# Patient Record
Sex: MALE | Race: WHITE | NOT HISPANIC OR LATINO | Employment: UNEMPLOYED | URBAN - METROPOLITAN AREA
[De-identification: names, ages, dates, MRNs, and addresses within clinical notes are randomized per-mention and may not be internally consistent; named-entity substitution may affect disease eponyms.]

---

## 2022-06-22 ENCOUNTER — HOSPITAL ENCOUNTER (EMERGENCY)
Facility: CLINIC | Age: 62
Discharge: HOME | End: 2022-06-22
Attending: EMERGENCY MEDICINE | Admitting: EMERGENCY MEDICINE
Payer: MEDICAID

## 2022-06-22 VITALS
WEIGHT: 175 LBS | RESPIRATION RATE: 18 BRPM | HEIGHT: 73 IN | HEART RATE: 86 BPM | SYSTOLIC BLOOD PRESSURE: 126 MMHG | TEMPERATURE: 98.5 F | OXYGEN SATURATION: 96 % | BODY MASS INDEX: 23.19 KG/M2 | DIASTOLIC BLOOD PRESSURE: 84 MMHG

## 2022-06-22 DIAGNOSIS — F10.920 ALCOHOLIC INTOXICATION WITHOUT COMPLICATION (H): ICD-10-CM

## 2022-06-22 LAB — HOLD SPECIMEN: NORMAL

## 2022-06-22 PROCEDURE — 99282 EMERGENCY DEPT VISIT SF MDM: CPT | Performed by: EMERGENCY MEDICINE

## 2022-06-22 NOTE — ED PROVIDER NOTES
[facility] EMERGENCY DEPARTMENT ([facility])       6/22/22  History     Chief Complaint   Patient presents with     Alcohol Problem     Pt has been sober for 35 days and relapsed 3 days ago. Pt seeking detox from alcohol     The history is provided by the patient and medical records.     Donovan Butler is a 61 year old malewho presents to the Emergency Department for an alcohol problem.  Patient reports drinking around a pint for the past 3 days after being 35 days.  Patient reports he is here from [place] for treatment and then was moved to the sober house.  Patient came from his sober house for detox.  Patient reports he started drinking because of increased free time and recently discussed some issues in a meeting.  He denies any suicidal homicidal ideation.  He does want to go back to his previous treatment facility at the [place].  He denies any other drug abuse.    Past Medical History  Past Medical History:   Diagnosis Date     COPD (chronic obstructive pulmonary disease) (H)      History reviewed. No pertinent surgical history.  No current outpatient medications on file.    No Known Allergies  Family History  History reviewed. No pertinent family history.  Social History   Social History     Tobacco Use     Smoking status: Current Every Day Smoker     Types: Cigarettes     Smokeless tobacco: Never Used   Substance Use Topics     Alcohol use: Yes     Comment: daily for las 3 days     Drug use: Not Currently      Past medical history, past surgical history, medications, allergies, family history, and social history were reviewed with the patient. No additional pertinent items.     I have reviewed the Medications, Allergies, Past Medical and Surgical History, and Social History in the Epic system.    Review of Systems  A complete review of systems was performed with pertinent positives and negatives noted in the HPI, and all other systems negative.    Physical Exam   BP: 126/84  Pulse: 86  Temp: 98.5  " F (36.9  C)  Resp: 18  Height: 185.4 cm (6' 1\")  Weight: 79.4 kg (175 lb)  SpO2: 96 %      Physical Exam  Vitals and nursing note reviewed.   Constitutional:       General: He is not in acute distress.     Appearance: He is well-developed.   HENT:      Head: Normocephalic.      Right Ear: External ear normal.      Left Ear: External ear normal.      Nose: Nose normal.   Eyes:      Extraocular Movements: Extraocular movements intact.      Conjunctiva/sclera: Conjunctivae normal.   Pulmonary:      Effort: Pulmonary effort is normal. No respiratory distress.   Abdominal:      General: Abdomen is flat. There is no distension.   Musculoskeletal:         General: No deformity. Normal range of motion.      Cervical back: Normal range of motion and neck supple.   Skin:     General: Skin is warm and dry.   Neurological:      Mental Status: He is alert.      Comments: Oriented, mildly slurred speech consistent with alcohol intoxication   Psychiatric:         Mood and Affect: Mood normal.         Behavior: Behavior normal.         ED Course     At 1:22 AM the patient was seen and examined by Umesh Syed DO in [facility]07 .        Procedures         Results for orders placed or performed during the hospital encounter of 06/22/22 (from the past 24 hour(s))   Extra Tube    Narrative    The following orders were created for panel order Extra Tube.  Procedure                               Abnormality         Status                     ---------                               -----------         ------                     Extra Urine Collection[[id number]]                           Final result                 Please view results for these tests on the individual orders.   Extra Urine Collection   Result Value Ref Range    Hold Specimen [facility]      Medications - No data to display          Assessments & Plan (with Medical Decision Making)   61-year-old male presents to us with chief complaint of alcohol intoxication.  He is not " suicidal and is experiencing no other symptoms.  This is otherwise uncomplicated.  He is only been drinking for the last 3 days.  He does not need detox at this time although he does want return back to treatment.  Patient was observed in the emergency department until the a.m. at which point he was discharged clinically sober.    I have reviewed the nursing notes.    I have reviewed the findings, diagnosis, plan and need for follow up with the patient.    There are no discharge medications for this patient.      Final diagnoses:   Alcoholic intoxication without complication (H)       I, Elias Conti am serving as a trained medical scribe to document services personally performed by Umesh Syed DO, based on the provider's statements to me.      I, Umesh Syed DO, was physically present and have reviewed and verified the accuracy of this note documented by Elias Conti.     Umesh Syed DO  6/22/2022   [facility] EMERGENCY DEPARTMENT     Umesh Syed DO  06/23/22 0330

## 2022-12-19 ENCOUNTER — PATIENT OUTREACH (OUTPATIENT)
Dept: CARE COORDINATION | Facility: CLINIC | Age: 62
End: 2022-12-19

## 2022-12-19 ASSESSMENT — ACTIVITIES OF DAILY LIVING (ADL): DEPENDENT_IADLS:: INDEPENDENT

## 2022-12-19 NOTE — PROGRESS NOTES
Clinic Care Coordination Contact    Clinic Care Coordination Contact  OUTREACH    Referral Information:  Referral Source: PCP         Chief Complaint   Patient presents with     Clinic Care Coordination - Initial        Universal Utilization:   Clinic Utilization  Difficulty keeping appointments:: No  Compliance Concerns: No  No-Show Concerns: No  Utilization    Hospital Admissions  0             ED Visits  1             No Show Count (past year)  0                Current as of: 12/19/2022 11:49 AM              Clinical Concerns:  Current Medical Concerns: shoulder in need of replacement, lung concerns  Current Behavioral Concerns: alcoholism in recovery    Education Provided to patient: introduced CC      Health Maintenance Reviewed: Up to date  Clinical Pathway: None    Medication Management:  Medication review status: Medications reviewed and no changes reported per patient.             Functional Status:  Dependent ADLs:: Independent  Dependent IADLs:: Independent  Bed or wheelchair confined:: No  Mobility Status: Independent  Fallen 2 or more times in the past year?: No  Any fall with injury in the past year?: No    Living Situation:  Current living arrangement:: I live in a private home with family  Type of residence:: Private home - no stairs    Lifestyle & Psychosocial Needs:PC to [facility] to assess for needs. Pt reports that he has figured out the dilemma of where to live while on pain medication after his surgery. He spoke to his sober  and they will have someone administer his medication once he is back at the sober house. Pt reports that he recently fell on the ice and cracked his ribs which hurts when he coughs. Pt reports to be seeing a pulminologist for some lung issues and coughs a lot. Pt has an appt with pulmonology in 5 weeks and will then look at getting shoulder surgery . Pt has a county   and a care coordinator with his health insurance. [facility] encouraged him to talk with his  CC prior to his surgery to ensure home care is covered and available to him post surgery. Pt denies any additional needs. He continues to be sober, attends meetings and has mental health support. He took this writers number in the event he should need anything moving forward.    Social Determinants of Health     Tobacco Use: High Risk     Smoking Tobacco Use: Every Day     Smokeless Tobacco Use: Never     Passive Exposure: Not on file   Alcohol Use: Not on file   Financial Resource Strain: Not on file   Food Insecurity: Not on file   Transportation Needs: Not on file   Physical Activity: Not on file   Stress: Not on file   Social Connections: Not on file   Intimate Partner Violence: Not on file   Depression: Not on file   Housing Stability: Not on file        Transportation means:: Public transportation     Mandaeism or spiritual beliefs that impact treatment:: No  Chemical Dependency Status: Past Concern  Chemical Dependency Management: Previous treatment, AA, Sponsor, Other (see comment) (sober house)             Resources and Interventions:  Current Resources:      Community Resources: Chemical Dependency Services, [facility] Programs  Supplies Currently Used at Home: None  Equipment Currently Used at Home: none  Employment Status: employed part-time         Advance Care Plan/Directive  Advanced Care Plans/Directives on file:: No    Referrals Placed: None         Care Plan:      Patient/Caregiver understanding:            Plan: Pt will follow up with pulmonology, orthopedic surgery, [facility] will do no further outreaches.

## 2023-01-10 ENCOUNTER — MEDICAL CORRESPONDENCE (OUTPATIENT)
Dept: HEALTH INFORMATION MANAGEMENT | Facility: CLINIC | Age: 63
End: 2023-01-10

## 2023-01-27 RX ORDER — BUDESONIDE AND FORMOTEROL FUMARATE DIHYDRATE 160; 4.5 UG/1; UG/1
2 AEROSOL RESPIRATORY (INHALATION) 2 TIMES DAILY
COMMUNITY

## 2023-01-27 RX ORDER — GABAPENTIN 100 MG/1
200 CAPSULE ORAL 2 TIMES DAILY
COMMUNITY

## 2023-01-27 RX ORDER — BUPROPION HYDROCHLORIDE 300 MG/1
300 TABLET ORAL EVERY MORNING
COMMUNITY

## 2023-01-27 RX ORDER — METHOCARBAMOL 750 MG/1
750 TABLET, FILM COATED ORAL 2 TIMES DAILY
COMMUNITY

## 2023-01-31 ENCOUNTER — ANESTHESIA EVENT (OUTPATIENT)
Dept: SURGERY | Facility: CLINIC | Age: 63
End: 2023-01-31
Payer: COMMERCIAL

## 2023-02-01 ENCOUNTER — APPOINTMENT (OUTPATIENT)
Dept: RADIOLOGY | Facility: CLINIC | Age: 63
End: 2023-02-01
Attending: PHYSICIAN ASSISTANT
Payer: COMMERCIAL

## 2023-02-01 ENCOUNTER — HOSPITAL ENCOUNTER (INPATIENT)
Facility: CLINIC | Age: 63
LOS: 1 days | Discharge: HOME | End: 2023-02-02
Attending: ORTHOPAEDIC SURGERY | Admitting: ORTHOPAEDIC SURGERY
Payer: COMMERCIAL

## 2023-02-01 ENCOUNTER — ANESTHESIA (OUTPATIENT)
Dept: SURGERY | Facility: CLINIC | Age: 63
End: 2023-02-01
Payer: COMMERCIAL

## 2023-02-01 DIAGNOSIS — Z96.611 STATUS POST TOTAL SHOULDER ARTHROPLASTY, RIGHT: Primary | ICD-10-CM

## 2023-02-01 PROBLEM — F33.40 RECURRENT MAJOR DEPRESSIVE DISORDER, IN REMISSION (H): Status: ACTIVE | Noted: 2023-02-01

## 2023-02-01 PROBLEM — J45.20 MILD INTERMITTENT ASTHMA WITHOUT COMPLICATION: Status: ACTIVE | Noted: 2023-02-01

## 2023-02-01 PROCEDURE — 120N000001 HC R&B MED SURG/OB

## 2023-02-01 PROCEDURE — 258N000003 HC RX IP 258 OP 636: Performed by: NURSE ANESTHETIST, CERTIFIED REGISTERED

## 2023-02-01 PROCEDURE — 710N000010 HC RECOVERY PHASE 1, LEVEL 2, PER MIN: Performed by: ORTHOPAEDIC SURGERY

## 2023-02-01 PROCEDURE — C1713 ANCHOR/SCREW BN/BN,TIS/BN: HCPCS | Performed by: ORTHOPAEDIC SURGERY

## 2023-02-01 PROCEDURE — 250N000011 HC RX IP 250 OP 636: Performed by: NURSE ANESTHETIST, CERTIFIED REGISTERED

## 2023-02-01 PROCEDURE — 360N000077 HC SURGERY LEVEL 4, PER MIN: Performed by: ORTHOPAEDIC SURGERY

## 2023-02-01 PROCEDURE — 250N000025 HC SEVOFLURANE, PER MIN: Performed by: ORTHOPAEDIC SURGERY

## 2023-02-01 PROCEDURE — C9290 INJ, BUPIVACAINE LIPOSOME: HCPCS | Performed by: ANESTHESIOLOGY

## 2023-02-01 PROCEDURE — 250N000009 HC RX 250: Performed by: ANESTHESIOLOGY

## 2023-02-01 PROCEDURE — 250N000011 HC RX IP 250 OP 636: Performed by: PHYSICIAN ASSISTANT

## 2023-02-01 PROCEDURE — 999N000141 HC STATISTIC PRE-PROCEDURE NURSING ASSESSMENT: Performed by: ORTHOPAEDIC SURGERY

## 2023-02-01 PROCEDURE — 999N000065 XR SHOULDER RIGHT PORT G/E 2 VIEWS: Mod: RT

## 2023-02-01 PROCEDURE — 99222 1ST HOSP IP/OBS MODERATE 55: CPT | Performed by: HOSPITALIST

## 2023-02-01 PROCEDURE — 250N000011 HC RX IP 250 OP 636: Performed by: ANESTHESIOLOGY

## 2023-02-01 PROCEDURE — 250N000009 HC RX 250: Performed by: NURSE ANESTHETIST, CERTIFIED REGISTERED

## 2023-02-01 PROCEDURE — C1776 JOINT DEVICE (IMPLANTABLE): HCPCS | Performed by: ORTHOPAEDIC SURGERY

## 2023-02-01 PROCEDURE — 258N000003 HC RX IP 258 OP 636: Performed by: PHYSICIAN ASSISTANT

## 2023-02-01 PROCEDURE — 0RRJ0JZ REPLACEMENT OF RIGHT SHOULDER JOINT WITH SYNTHETIC SUBSTITUTE, OPEN APPROACH: ICD-10-PCS | Performed by: ORTHOPAEDIC SURGERY

## 2023-02-01 PROCEDURE — 250N000011 HC RX IP 250 OP 636: Performed by: ORTHOPAEDIC SURGERY

## 2023-02-01 PROCEDURE — 258N000003 HC RX IP 258 OP 636: Performed by: ANESTHESIOLOGY

## 2023-02-01 PROCEDURE — 370N000017 HC ANESTHESIA TECHNICAL FEE, PER MIN: Performed by: ORTHOPAEDIC SURGERY

## 2023-02-01 PROCEDURE — 0LM30ZZ REATTACHMENT OF RIGHT UPPER ARM TENDON, OPEN APPROACH: ICD-10-PCS | Performed by: ORTHOPAEDIC SURGERY

## 2023-02-01 PROCEDURE — 258N000001 HC RX 258: Performed by: ORTHOPAEDIC SURGERY

## 2023-02-01 PROCEDURE — 250N000013 HC RX MED GY IP 250 OP 250 PS 637: Performed by: PHYSICIAN ASSISTANT

## 2023-02-01 PROCEDURE — 272N000001 HC OR GENERAL SUPPLY STERILE: Performed by: ORTHOPAEDIC SURGERY

## 2023-02-01 DEVICE — IMPLANTABLE DEVICE
Type: IMPLANTABLE DEVICE | Site: SHOULDER | Status: FUNCTIONAL
Brand: TORNIER FLEX SHOULDER SYSTEM

## 2023-02-01 DEVICE — BONE CEMENT SIMPLEX FULL DOSE 6191-1-001: Type: IMPLANTABLE DEVICE | Site: SHOULDER | Status: FUNCTIONAL

## 2023-02-01 DEVICE — NICELOOP GREEN BRAIDED PTFE IMPREGNATED POLYESTER SIZE 5 24" LOOPED KAC-25 NEEDLE TORNIER
Type: IMPLANTABLE DEVICE | Site: SHOULDER | Status: FUNCTIONAL
Brand: TELEFLEX

## 2023-02-01 DEVICE — IMPLANTABLE DEVICE: Type: IMPLANTABLE DEVICE | Site: SHOULDER | Status: FUNCTIONAL

## 2023-02-01 RX ORDER — LIDOCAINE 40 MG/G
CREAM TOPICAL
Status: DISCONTINUED | OUTPATIENT
Start: 2023-02-01 | End: 2023-02-02 | Stop reason: HOSPADM

## 2023-02-01 RX ORDER — TRANEXAMIC ACID 650 MG/1
1950 TABLET ORAL ONCE
Status: COMPLETED | OUTPATIENT
Start: 2023-02-01 | End: 2023-02-01

## 2023-02-01 RX ORDER — HYDROCODONE BITARTRATE AND ACETAMINOPHEN 5; 325 MG/1; MG/1
1-2 TABLET ORAL EVERY 4 HOURS PRN
Qty: 20 TABLET | Refills: 0 | Status: SHIPPED | OUTPATIENT
Start: 2023-02-01 | End: 2023-02-02

## 2023-02-01 RX ORDER — ACETAMINOPHEN 325 MG/1
975 TABLET ORAL ONCE
Status: COMPLETED | OUTPATIENT
Start: 2023-02-01 | End: 2023-02-01

## 2023-02-01 RX ORDER — OXYCODONE HYDROCHLORIDE 5 MG/1
10 TABLET ORAL EVERY 4 HOURS PRN
Status: DISCONTINUED | OUTPATIENT
Start: 2023-02-01 | End: 2023-02-01 | Stop reason: HOSPADM

## 2023-02-01 RX ORDER — AMOXICILLIN 250 MG
1 CAPSULE ORAL 2 TIMES DAILY
Status: DISCONTINUED | OUTPATIENT
Start: 2023-02-01 | End: 2023-02-02 | Stop reason: HOSPADM

## 2023-02-01 RX ORDER — METHOCARBAMOL 750 MG/1
750 TABLET, FILM COATED ORAL 2 TIMES DAILY
Status: DISCONTINUED | OUTPATIENT
Start: 2023-02-01 | End: 2023-02-02 | Stop reason: HOSPADM

## 2023-02-01 RX ORDER — BUPIVACAINE HYDROCHLORIDE 5 MG/ML
INJECTION, SOLUTION EPIDURAL; INTRACAUDAL
Status: COMPLETED | OUTPATIENT
Start: 2023-02-01 | End: 2023-02-01

## 2023-02-01 RX ORDER — NALOXONE HYDROCHLORIDE 0.4 MG/ML
0.2 INJECTION, SOLUTION INTRAMUSCULAR; INTRAVENOUS; SUBCUTANEOUS
Status: DISCONTINUED | OUTPATIENT
Start: 2023-02-01 | End: 2023-02-02 | Stop reason: HOSPADM

## 2023-02-01 RX ORDER — ACETAMINOPHEN 325 MG/1
650 TABLET ORAL EVERY 4 HOURS PRN
Qty: 100 TABLET | Refills: 0 | Status: SHIPPED | OUTPATIENT
Start: 2023-02-01

## 2023-02-01 RX ORDER — BUPROPION HYDROCHLORIDE 300 MG/1
300 TABLET ORAL EVERY MORNING
Status: DISCONTINUED | OUTPATIENT
Start: 2023-02-02 | End: 2023-02-02 | Stop reason: HOSPADM

## 2023-02-01 RX ORDER — DEXAMETHASONE SODIUM PHOSPHATE 10 MG/ML
INJECTION, SOLUTION INTRAMUSCULAR; INTRAVENOUS PRN
Status: DISCONTINUED | OUTPATIENT
Start: 2023-02-01 | End: 2023-02-01

## 2023-02-01 RX ORDER — NALOXONE HYDROCHLORIDE 0.4 MG/ML
0.4 INJECTION, SOLUTION INTRAMUSCULAR; INTRAVENOUS; SUBCUTANEOUS
Status: DISCONTINUED | OUTPATIENT
Start: 2023-02-01 | End: 2023-02-02 | Stop reason: HOSPADM

## 2023-02-01 RX ORDER — SODIUM CHLORIDE, SODIUM LACTATE, POTASSIUM CHLORIDE, CALCIUM CHLORIDE 600; 310; 30; 20 MG/100ML; MG/100ML; MG/100ML; MG/100ML
INJECTION, SOLUTION INTRAVENOUS CONTINUOUS
Status: DISCONTINUED | OUTPATIENT
Start: 2023-02-01 | End: 2023-02-01 | Stop reason: HOSPADM

## 2023-02-01 RX ORDER — FLUTICASONE FUROATE AND VILANTEROL 200; 25 UG/1; UG/1
1 POWDER RESPIRATORY (INHALATION) DAILY
Status: DISCONTINUED | OUTPATIENT
Start: 2023-02-01 | End: 2023-02-01

## 2023-02-01 RX ORDER — OXYCODONE HYDROCHLORIDE 5 MG/1
5 TABLET ORAL EVERY 4 HOURS PRN
Status: DISCONTINUED | OUTPATIENT
Start: 2023-02-01 | End: 2023-02-02 | Stop reason: HOSPADM

## 2023-02-01 RX ORDER — VANCOMYCIN HYDROCHLORIDE 1 G/20ML
INJECTION, POWDER, LYOPHILIZED, FOR SOLUTION INTRAVENOUS PRN
Status: DISCONTINUED | OUTPATIENT
Start: 2023-02-01 | End: 2023-02-01 | Stop reason: HOSPADM

## 2023-02-01 RX ORDER — FENTANYL CITRATE 50 UG/ML
25 INJECTION, SOLUTION INTRAMUSCULAR; INTRAVENOUS EVERY 5 MIN PRN
Status: DISCONTINUED | OUTPATIENT
Start: 2023-02-01 | End: 2023-02-01 | Stop reason: HOSPADM

## 2023-02-01 RX ORDER — CEFAZOLIN SODIUM/WATER 2 G/20 ML
2 SYRINGE (ML) INTRAVENOUS
Status: COMPLETED | OUTPATIENT
Start: 2023-02-01 | End: 2023-02-01

## 2023-02-01 RX ORDER — FENTANYL CITRATE 50 UG/ML
INJECTION, SOLUTION INTRAMUSCULAR; INTRAVENOUS PRN
Status: DISCONTINUED | OUTPATIENT
Start: 2023-02-01 | End: 2023-02-01

## 2023-02-01 RX ORDER — FENTANYL CITRATE 50 UG/ML
50 INJECTION, SOLUTION INTRAMUSCULAR; INTRAVENOUS EVERY 5 MIN PRN
Status: DISCONTINUED | OUTPATIENT
Start: 2023-02-01 | End: 2023-02-01 | Stop reason: HOSPADM

## 2023-02-01 RX ORDER — BISACODYL 10 MG
10 SUPPOSITORY, RECTAL RECTAL DAILY PRN
Status: DISCONTINUED | OUTPATIENT
Start: 2023-02-01 | End: 2023-02-02 | Stop reason: HOSPADM

## 2023-02-01 RX ORDER — HYDROMORPHONE HCL IN WATER/PF 6 MG/30 ML
0.4 PATIENT CONTROLLED ANALGESIA SYRINGE INTRAVENOUS EVERY 5 MIN PRN
Status: DISCONTINUED | OUTPATIENT
Start: 2023-02-01 | End: 2023-02-01 | Stop reason: HOSPADM

## 2023-02-01 RX ORDER — ASPIRIN 81 MG/1
81 TABLET ORAL 2 TIMES DAILY
Status: DISCONTINUED | OUTPATIENT
Start: 2023-02-01 | End: 2023-02-02 | Stop reason: HOSPADM

## 2023-02-01 RX ORDER — SODIUM CHLORIDE, SODIUM LACTATE, POTASSIUM CHLORIDE, CALCIUM CHLORIDE 600; 310; 30; 20 MG/100ML; MG/100ML; MG/100ML; MG/100ML
INJECTION, SOLUTION INTRAVENOUS CONTINUOUS
Status: DISCONTINUED | OUTPATIENT
Start: 2023-02-01 | End: 2023-02-02 | Stop reason: HOSPADM

## 2023-02-01 RX ORDER — CEFAZOLIN SODIUM/WATER 2 G/20 ML
2 SYRINGE (ML) INTRAVENOUS SEE ADMIN INSTRUCTIONS
Status: DISCONTINUED | OUTPATIENT
Start: 2023-02-01 | End: 2023-02-01 | Stop reason: HOSPADM

## 2023-02-01 RX ORDER — HYDROMORPHONE HCL IN WATER/PF 6 MG/30 ML
0.4 PATIENT CONTROLLED ANALGESIA SYRINGE INTRAVENOUS
Status: DISCONTINUED | OUTPATIENT
Start: 2023-02-01 | End: 2023-02-02 | Stop reason: HOSPADM

## 2023-02-01 RX ORDER — HYDROMORPHONE HCL IN WATER/PF 6 MG/30 ML
0.2 PATIENT CONTROLLED ANALGESIA SYRINGE INTRAVENOUS
Status: DISCONTINUED | OUTPATIENT
Start: 2023-02-01 | End: 2023-02-02 | Stop reason: HOSPADM

## 2023-02-01 RX ORDER — CEFAZOLIN SODIUM 1 G/3ML
1 INJECTION, POWDER, FOR SOLUTION INTRAMUSCULAR; INTRAVENOUS EVERY 8 HOURS
Status: COMPLETED | OUTPATIENT
Start: 2023-02-01 | End: 2023-02-02

## 2023-02-01 RX ORDER — PROCHLORPERAZINE MALEATE 10 MG
10 TABLET ORAL EVERY 6 HOURS PRN
Status: DISCONTINUED | OUTPATIENT
Start: 2023-02-01 | End: 2023-02-02 | Stop reason: HOSPADM

## 2023-02-01 RX ORDER — HYDROXYZINE HYDROCHLORIDE 25 MG/1
25 TABLET, FILM COATED ORAL EVERY 6 HOURS PRN
Status: DISCONTINUED | OUTPATIENT
Start: 2023-02-01 | End: 2023-02-02 | Stop reason: HOSPADM

## 2023-02-01 RX ORDER — ACETAMINOPHEN 325 MG/1
650 TABLET ORAL EVERY 4 HOURS PRN
Status: DISCONTINUED | OUTPATIENT
Start: 2023-02-04 | End: 2023-02-02 | Stop reason: HOSPADM

## 2023-02-01 RX ORDER — HYDROMORPHONE HCL IN WATER/PF 6 MG/30 ML
0.2 PATIENT CONTROLLED ANALGESIA SYRINGE INTRAVENOUS EVERY 5 MIN PRN
Status: DISCONTINUED | OUTPATIENT
Start: 2023-02-01 | End: 2023-02-01 | Stop reason: HOSPADM

## 2023-02-01 RX ORDER — VANCOMYCIN HYDROCHLORIDE 1 G/20ML
INJECTION, POWDER, LYOPHILIZED, FOR SOLUTION INTRAVENOUS
Status: DISCONTINUED
Start: 2023-02-01 | End: 2023-02-01 | Stop reason: HOSPADM

## 2023-02-01 RX ORDER — OXYCODONE HYDROCHLORIDE 5 MG/1
10 TABLET ORAL EVERY 4 HOURS PRN
Status: DISCONTINUED | OUTPATIENT
Start: 2023-02-01 | End: 2023-02-02 | Stop reason: HOSPADM

## 2023-02-01 RX ORDER — ONDANSETRON 4 MG/1
4 TABLET, ORALLY DISINTEGRATING ORAL EVERY 6 HOURS PRN
Status: DISCONTINUED | OUTPATIENT
Start: 2023-02-01 | End: 2023-02-02 | Stop reason: HOSPADM

## 2023-02-01 RX ORDER — ONDANSETRON 4 MG/1
4 TABLET, ORALLY DISINTEGRATING ORAL EVERY 30 MIN PRN
Status: DISCONTINUED | OUTPATIENT
Start: 2023-02-01 | End: 2023-02-01 | Stop reason: HOSPADM

## 2023-02-01 RX ORDER — ONDANSETRON 2 MG/ML
4 INJECTION INTRAMUSCULAR; INTRAVENOUS EVERY 30 MIN PRN
Status: DISCONTINUED | OUTPATIENT
Start: 2023-02-01 | End: 2023-02-01 | Stop reason: HOSPADM

## 2023-02-01 RX ORDER — OXYCODONE HYDROCHLORIDE 5 MG/1
5 TABLET ORAL EVERY 4 HOURS PRN
Status: DISCONTINUED | OUTPATIENT
Start: 2023-02-01 | End: 2023-02-01 | Stop reason: HOSPADM

## 2023-02-01 RX ORDER — GABAPENTIN 100 MG/1
200 CAPSULE ORAL 2 TIMES DAILY
Status: DISCONTINUED | OUTPATIENT
Start: 2023-02-01 | End: 2023-02-02 | Stop reason: HOSPADM

## 2023-02-01 RX ORDER — POLYETHYLENE GLYCOL 3350 17 G/17G
17 POWDER, FOR SOLUTION ORAL DAILY
Status: DISCONTINUED | OUTPATIENT
Start: 2023-02-02 | End: 2023-02-02 | Stop reason: HOSPADM

## 2023-02-01 RX ORDER — FENTANYL CITRATE 50 UG/ML
100 INJECTION, SOLUTION INTRAMUSCULAR; INTRAVENOUS
Status: DISCONTINUED | OUTPATIENT
Start: 2023-02-01 | End: 2023-02-01 | Stop reason: HOSPADM

## 2023-02-01 RX ORDER — ACETAMINOPHEN 325 MG/1
975 TABLET ORAL EVERY 8 HOURS
Status: DISCONTINUED | OUTPATIENT
Start: 2023-02-01 | End: 2023-02-02 | Stop reason: HOSPADM

## 2023-02-01 RX ORDER — PROPOFOL 10 MG/ML
INJECTION, EMULSION INTRAVENOUS PRN
Status: DISCONTINUED | OUTPATIENT
Start: 2023-02-01 | End: 2023-02-01

## 2023-02-01 RX ORDER — ONDANSETRON 2 MG/ML
4 INJECTION INTRAMUSCULAR; INTRAVENOUS EVERY 6 HOURS PRN
Status: DISCONTINUED | OUTPATIENT
Start: 2023-02-01 | End: 2023-02-02 | Stop reason: HOSPADM

## 2023-02-01 RX ORDER — GLYCOPYRROLATE 0.2 MG/ML
INJECTION, SOLUTION INTRAMUSCULAR; INTRAVENOUS PRN
Status: DISCONTINUED | OUTPATIENT
Start: 2023-02-01 | End: 2023-02-01

## 2023-02-01 RX ORDER — ASPIRIN 81 MG/1
81 TABLET ORAL 2 TIMES DAILY
Qty: 28 TABLET | Refills: 0 | Status: SHIPPED | OUTPATIENT
Start: 2023-02-01

## 2023-02-01 RX ORDER — LIDOCAINE 40 MG/G
CREAM TOPICAL
Status: DISCONTINUED | OUTPATIENT
Start: 2023-02-01 | End: 2023-02-01 | Stop reason: HOSPADM

## 2023-02-01 RX ORDER — VANCOMYCIN HYDROCHLORIDE 1 G/20ML
1 INJECTION, POWDER, LYOPHILIZED, FOR SOLUTION INTRAVENOUS ONCE
Status: DISCONTINUED | OUTPATIENT
Start: 2023-02-01 | End: 2023-02-01 | Stop reason: HOSPADM

## 2023-02-01 RX ORDER — BUDESONIDE AND FORMOTEROL FUMARATE DIHYDRATE 160; 4.5 UG/1; UG/1
2 AEROSOL RESPIRATORY (INHALATION)
Status: DISCONTINUED | OUTPATIENT
Start: 2023-02-01 | End: 2023-02-02 | Stop reason: HOSPADM

## 2023-02-01 RX ORDER — ONDANSETRON 2 MG/ML
INJECTION INTRAMUSCULAR; INTRAVENOUS PRN
Status: DISCONTINUED | OUTPATIENT
Start: 2023-02-01 | End: 2023-02-01

## 2023-02-01 RX ADMIN — Medication 2 G: at 10:14

## 2023-02-01 RX ADMIN — BUDESONIDE AND FORMOTEROL FUMARATE DIHYDRATE 2 PUFF: 160; 4.5 AEROSOL RESPIRATORY (INHALATION) at 20:42

## 2023-02-01 RX ADMIN — BENZOCAINE AND MENTHOL 1 LOZENGE: 15; 3.6 LOZENGE ORAL at 23:30

## 2023-02-01 RX ADMIN — ACETAMINOPHEN 975 MG: 325 TABLET ORAL at 23:30

## 2023-02-01 RX ADMIN — PHENYLEPHRINE HYDROCHLORIDE 0.5 MCG/KG/MIN: 10 INJECTION INTRAVENOUS at 10:35

## 2023-02-01 RX ADMIN — GABAPENTIN 200 MG: 100 CAPSULE ORAL at 20:42

## 2023-02-01 RX ADMIN — PROPOFOL 75 MCG/KG/MIN: 10 INJECTION, EMULSION INTRAVENOUS at 10:35

## 2023-02-01 RX ADMIN — FENTANYL CITRATE 50 MCG: 50 INJECTION, SOLUTION INTRAMUSCULAR; INTRAVENOUS at 09:30

## 2023-02-01 RX ADMIN — ASPIRIN 81 MG: 81 TABLET, COATED ORAL at 20:42

## 2023-02-01 RX ADMIN — GLYCOPYRROLATE 0.2 MG: 0.2 INJECTION, SOLUTION INTRAMUSCULAR; INTRAVENOUS at 11:22

## 2023-02-01 RX ADMIN — ROCURONIUM BROMIDE 50 MG: 10 INJECTION, SOLUTION INTRAVENOUS at 10:23

## 2023-02-01 RX ADMIN — PROPOFOL 180 MG: 10 INJECTION, EMULSION INTRAVENOUS at 10:23

## 2023-02-01 RX ADMIN — SENNOSIDES AND DOCUSATE SODIUM 1 TABLET: 50; 8.6 TABLET ORAL at 20:42

## 2023-02-01 RX ADMIN — SODIUM CHLORIDE, POTASSIUM CHLORIDE, SODIUM LACTATE AND CALCIUM CHLORIDE: 600; 310; 30; 20 INJECTION, SOLUTION INTRAVENOUS at 15:34

## 2023-02-01 RX ADMIN — ONDANSETRON 4 MG: 2 INJECTION INTRAMUSCULAR; INTRAVENOUS at 12:25

## 2023-02-01 RX ADMIN — BUPIVACAINE 10 ML: 13.3 INJECTION, SUSPENSION, LIPOSOMAL INFILTRATION at 09:30

## 2023-02-01 RX ADMIN — SUGAMMADEX 200 MG: 100 INJECTION, SOLUTION INTRAVENOUS at 12:41

## 2023-02-01 RX ADMIN — SODIUM CHLORIDE, POTASSIUM CHLORIDE, SODIUM LACTATE AND CALCIUM CHLORIDE: 600; 310; 30; 20 INJECTION, SOLUTION INTRAVENOUS at 10:18

## 2023-02-01 RX ADMIN — SODIUM CHLORIDE, POTASSIUM CHLORIDE, SODIUM LACTATE AND CALCIUM CHLORIDE: 600; 310; 30; 20 INJECTION, SOLUTION INTRAVENOUS at 10:00

## 2023-02-01 RX ADMIN — DEXAMETHASONE SODIUM PHOSPHATE 10 MG: 10 INJECTION, SOLUTION INTRAMUSCULAR; INTRAVENOUS at 10:38

## 2023-02-01 RX ADMIN — CEFAZOLIN 1 G: 1 INJECTION, POWDER, FOR SOLUTION INTRAMUSCULAR; INTRAVENOUS at 17:27

## 2023-02-01 RX ADMIN — MIDAZOLAM HYDROCHLORIDE 2 MG: 1 INJECTION, SOLUTION INTRAMUSCULAR; INTRAVENOUS at 09:30

## 2023-02-01 RX ADMIN — FENTANYL CITRATE 50 MCG: 50 INJECTION, SOLUTION INTRAMUSCULAR; INTRAVENOUS at 10:22

## 2023-02-01 RX ADMIN — BUPIVACAINE HYDROCHLORIDE 3 ML: 5 INJECTION, SOLUTION EPIDURAL; INTRACAUDAL; PERINEURAL at 09:30

## 2023-02-01 RX ADMIN — ROCURONIUM BROMIDE 30 MG: 10 INJECTION, SOLUTION INTRAVENOUS at 11:33

## 2023-02-01 RX ADMIN — ACETAMINOPHEN 975 MG: 325 TABLET ORAL at 09:09

## 2023-02-01 RX ADMIN — TRANEXAMIC ACID 1950 MG: 650 TABLET ORAL at 09:09

## 2023-02-01 RX ADMIN — METHOCARBAMOL 750 MG: 750 TABLET, FILM COATED ORAL at 20:41

## 2023-02-01 RX ADMIN — BUPIVACAINE HYDROCHLORIDE 12 ML: 5 INJECTION, SOLUTION EPIDURAL; INTRACAUDAL at 09:30

## 2023-02-01 ASSESSMENT — ACTIVITIES OF DAILY LIVING (ADL)
ADLS_ACUITY_SCORE: 20
ADLS_ACUITY_SCORE: 35
ADLS_ACUITY_SCORE: 20
ADLS_ACUITY_SCORE: 18
ADLS_ACUITY_SCORE: 18
ADLS_ACUITY_SCORE: 20
ADLS_ACUITY_SCORE: 20
ADLS_ACUITY_SCORE: 18

## 2023-02-01 ASSESSMENT — LIFESTYLE VARIABLES: TOBACCO_USE: 1

## 2023-02-01 ASSESSMENT — COPD QUESTIONNAIRES: COPD: 1

## 2023-02-01 NOTE — CONSULTS
[facility]  Consult Note - Hospitalist Service  Date of Admission:  2/1/2023  Consult Requested by: Orthopedic Surgery   Reason for Consult: Asthma, MDD, postop cares    Assessment & Plan   Donovan Butler is a 62 year old old male with a history of asthma, MDD, OA underwent right total shoulder surgery with general anesthesia. [facility] [place] was asked to evaluate patient for postoperative medical management as follows below. Please resume the home medications as reconciled and further noted below with ordered hold parameters.  Vital signs have been stable post-operatively including hemodynamically stable blood pressure and heart rate. Thank you for this consult; we will continue to follow this patient until discharge.    Asthma  -Order home inhalers and medications.   -Nebulizer and RT pulmonary hygiene as needed.     MDD/Anxiety  -Order home medications.     S/p Right Shoulder Surgery  Shoulder OA  Acute Post-Op Pain  -Agree with current pain control regimen; consider acute pain team consultation if pain becomes increasingly difficult to control or proves refractory.   -PT evaluation.   -OT evaluation.  -IS frequently, initially every hour while awake as tolerated. Directly encouraged and discussed.      Post-Op DVT Prophylaxis  -As per primary surgery team.      Procedure(s):  RIGHT TOTAL SHOULDER ARTHROPLASTY  Post-operative Day: Day of Surgery  Code status:Full Code     Type of Anesthesia   General    Estimated Blood Loss:  200 mL    Hospital Problem List   No problem-specific Assessment & Plan notes found for this encounter.    Principal Problem:    Status post total shoulder arthroplasty, right  Active Problems:    Mild intermittent asthma without complication    Recurrent major depressive disorder, in remission (H)      -Reviewed the patient's preoperative H and P and updated missing elements.  -Home medication reconciliation has been reviewed.  Medications have been ordered as noted  from the home list and changes are documented above        Clinically Significant Risk Factors Present on Admission                               Deejay Owen MD  Hospitalist Service  Securely message with [employer] (more info)  Text page via [facility] Paging/Directory   ______________________________________________________________________    Chief Complaint   Asthma, MDD, postop cares    History is obtained from the patient    History of Present Illness   Donovan Butler is a 62 year old old male with a history of asthma, MDD, OA underwent right total shoulder surgery with general anesthesia. [facility] service was asked to evaluate patient for postoperative medical management.    He has been in his usual state of health prior to presenting for this elective surgery.  He denies any associated symptoms prior to coming in today, and also denies any recent travel domestically or internationally, and also denies any recent sick contacts around him including any family or friends who have been sick.  When asked, he denies any fevers, rigors, chest pain or shortness of breath, nausea or vomiting or abdominal pain, changes in bowel movements/pattern, urinary symptoms, focal weakness, or any other new and associated symptoms at this time.  He has been compliant with his medications.  14 point review of systems performed with the patient without any other pertinent positives at this time.    Presently, he states his pain is currently well-tolerated.  He denies any new complaints or symptoms at this time.    His social history, family history, and past medical history were directly reviewed with him and corroborated to be accurate as documented below. All questions he had at this time were answered to his verbalized and stated satisfaction and understanding.       Past Medical History    Past Medical History:   Diagnosis Date     COPD (chronic obstructive pulmonary disease) (H)      Depressive disorder      Mild intermittent asthma  without complication 2/1/2023       Past Surgical History   History reviewed. No pertinent surgical history.    Medications   I have reviewed this patient's current medications  Medications Prior to Admission   Medication Sig Dispense Refill Last Dose     budesonide-formoterol (SYMBICORT) 160-4.5 MCG/ACT Inhaler Inhale 2 puffs into the lungs 2 times daily   1/31/2023 at has with     buPROPion (WELLBUTRIN XL) 300 MG 24 hr tablet Take 300 mg by mouth every morning   2/1/2023 at am     gabapentin (NEURONTIN) 100 MG capsule Take 200 mg by mouth 2 times daily   2/1/2023 at am     methocarbamol (ROBAXIN) 750 MG tablet Take 750 mg by mouth 2 times daily   1/31/2023     nicotine polacrilex (NICORETTE) 4 MG gum Place 4 mg inside cheek every hour as needed for smoking cessation   Past Week             Physical Exam   Vital Signs: Temp: 97.5  F (36.4  C) Temp src: Oral BP: 92/64 Pulse: 82   Resp: 18 SpO2: 92 % O2 Device: None (Room air) Oxygen Delivery: 5 LPM  Weight: 168 lbs 11.2 oz    GENERAL:  Alert, appears comfortable, in no acute distress, appears stated age   HEAD:  Normocephalic, without obvious abnormality, atraumatic   EYES:  PERRL, conjunctiva/corneas clear, no scleral icterus, EOM's intact   NOSE: Nares normal, septum midline, mucosa normal, no drainage   THROAT: Lips, mucosa, and tongue normal; teeth and gums normal, mouth moist   NECK: Supple, symmetrical, trachea midline   BACK:   Symmetric, no curvature, ROM normal   LUNGS:   Clear to auscultation bilaterally, no rales, rhonchi, or wheezing, symmetric chest rise on inhalation, respirations unlabored   CHEST WALL:  No tenderness or deformity   HEART:  Regular rate and rhythm, S1 and S2 normal, no murmur, rub, or gallop    ABDOMEN:   Soft, non-tender, bowel sounds active all four quadrants, no masses, no organomegaly, no rebound or guarding   EXTREMITIES: Extremities normal, atraumatic, no cyanosis or edema    SKIN: Dry to touch, no exanthems in the visualized  "areas   NEURO: Alert, oriented x 4, moves all four extremities freely/spontaneously   PSYCH: Cooperative, behavior is appropriate      Medical Decision Making     65 MINUTES SPENT BY ME on the date of service doing chart review, history, exam, documentation & further activities per the note.      Data       Imaging results reviewed over the past 24 hrs:   Recent Results (from the past 24 hour(s))   POC US Guidance Needle Placement    Narrative    Ultrasound was performed as guidance to an anesthesia procedure.  Click   \"PACS images\" hyperlink below to view any stored images.  For specific   procedure details, view procedure note authored by anesthesia.   XR Shoulder Right Port G/E 2 Views    Narrative    EXAM: XR SHOULDER RIGHT PORT G/E 2 VIEWS  LOCATION: [facility]  DATE/TIME: 2/1/2023 2:49 PM    INDICATION: Status post surgery  COMPARISON: 10/26/2022      Impression    IMPRESSION: Recent shoulder arthroplasty. Alignment is satisfactory. No acute fracture. Postoperative gas in the soft tissues.     "

## 2023-02-01 NOTE — OP NOTE
Preoperative diagnosis:  1.  End-stage right shoulder glenohumeral osteoarthritis.    Postoperative diagnosis:  1.  End-stage right shoulder glenohumeral osteoarthritis.    Procedure(s) performed:  1. Right total shoulder arthroplasty.  2. Right shoulder open biceps tenodesis.    Attending Surgeon: Peter Ferrell MD  Assistant: Mikki Griggs PA-C (A skilled assistant was necessary for this procedure to help with patient positioning, manipulation and prep the surgical extremity, instrumentation, and safe/timely progress of the procedure.)    Anesthesia: General endotracheal plus regional block  IV fluids: See anesthesia record  Estimated blood loss: 200 mL    Specimens: None  Drains: None    Complications: None apparent  Disposition: Stable to PACU    Indications:  This patient is a 62-year-old right-hand-dominant male who presented with complaints of severe shoulder pain and stiffness, found on subsequent imaging to have advanced osteoarthritis of the glenohumeral joint in addition to an intact rotator cuff.  Following failure of appropriate nonoperative management, recommendation for total shoulder arthroplasty was made.  Risks, benefits, and alternatives were reviewed.  Patient indicated desire to proceed informed consent was obtained.    Operative findings:  Advanced osteoarthritis of the right shoulder.  The rotator cuff appeared intact.  Successful total shoulder arthroplasty was completed with placement of a Tornier Aequalis Ascend Flex size 3B press fit humeral stem, 48 x 20 mm low offset humeral head, and medium 15 degree posterior augmented all-polyethylene Perform+ glenoid component.    Procedure in detail:  The patient was identified in preoperative holding where the right shoulder was marked and surgical site was confirmed.  Regional block was administered by esthesia with appropriate affect.  The patient was then brought to the operating room placed supine the table where general anesthetic was induced.   The patient was inclined into partial beachchair position with the head secured and all bony/neural prominences padded appropriately.  The right shoulder was examined under anesthesia, after which the right upper extremity was prepped and draped in usual sterile fashion.  A presurgical timeout was completed.  Antibiotics were administered by anesthesia just prior to incision.    Standard deltopectoral approach was utilized.  The skin incision was made and skin flaps developed.  The cephalic vein was identified and mobilized, subsequently retracted laterally with the deltoid.  The deltopectoral interval was then entered and a self-retaining retractor was placed.  The fascia alongside the lateral border of the conjoined tendon was incised, and the medial blade of the retractor was placed deep to that.  I palpated the axillary nerve and it was protected throughout the procedure.  The 3 sisters vessels were then identified at the inferior border of the subscapularis and cauterized.  The transverse humeral ligament was incised to expose the long head biceps tendon, which was notable to have significant tendinopathy as well as 1 or 2 loose bodies in the bicipital groove.  #2 FiberWire sutures were used to tenodese the biceps tendon to the adjacent pectoralis major insertion.  The biceps tendon was then incised just above the level of the tenodesis and loose bodies were removed.  Curved Keys scissors were followed along the biceps proximally to open the rotator interval and a moderate effusion was evacuated from the joint.  The proximal biceps stump was then resected.    The subscapularis was then elevated from the lesser tuberosity using a peel technique.  Two #2 FiberWire sutures were placed in the subscap tendon as tag sutures.  The shoulder was progressively externally rotated and the capsule was released down to the 6 o'clock position on humeral neck.  The head was inspected, and advanced osteoarthritis was confirmed  with quite large osteophytes along the anterior and inferior aspect of the neck.  These osteophytes were resected with a rongeur. The head was then dislocated anteriorly by bringing the arm into extension, external rotation, and adduction.  The superior cuff was inspected and seen to be intact.  A drill hole was placed in the superior apex of the humeral head, and the intramedullary cut guide was inserted.  The humeral cut was then made using the intramedullary guide in 30 degrees of retroversion.  The humeral canal was then sequentially broached up to a size 2 humeral broach, which had good fit and rotational stability.  The head cut protector was then placed.    A posterior glenoid retractor was placed across the joint to retract the humerus posteriorly.  The anterior capsule was released at the glenoid to better mobilize the subscapularis and a Bankart retractor was placed anteriorly.  This gave nice exposure of the glenoid.  On inspection, advanced osteoarthritis was also confirmed here, with posterior wear and early biconcavity.  There was a small amount of remaining cartilage along the anterior flap portion of the glenoid, but it was completely absent along the posterior half.  The remnant labrum was resected circumferentially.  Residual cartilage was scraped from the glenoid face with a Camacho elevator.  The glenoid was then sized, and the central guidepin was placed bicortically.  I could palpate its tip coming out at the base of the glenoid vault anteriorly just in front of the scapular body.  Pleased with that, the anterior paleoglenoid was reamed flat with the appropriate size reamer.  The 15 degree angled posterior reamer and instrumentation were then used to ream the posterior neoglenoid.  Secondary to the wear, almost no bone was removed posteriorly.  Care was taken not to ream too far medially, leaving the subchondral bone plate intact.  Once pleased with the reaming, the 3 peripheral peg holes and the  central peg holes were drilled with the appropriate instrumentation.  The glenoid bone was noted to be extremely hard secondary to sclerosis.   The glenoid was irrigated with pulse lavage.  The peg holes were dried with suction, and a pea-sized amount of polymethylmethacrylate cement was injected and pressurized into the 3 peripheral peg holes.  The size medium 15 degree posterior augmented all-poly glenoid component was then placed and impacted into position.  This was held until the cement had cured.    The humerus was again exposed.  An appropriately sized trial head was placed and the shoulder was reduced.  There appeared to be appropriate tension on the soft tissues, subscapularis could be easily reduced with the shoulder in about 40 degrees of external rotation, and there was roughly 50% posterior translation and good bounce back.  Pleased with that, the head trial and humeral stem were removed.  Several small drill holes were then placed in the lesser tuberosity through which a total of three Nice loop sutures were passed for later subscapularis repair.The humerus was then irrigated with pulse lavage.  The size 2 humeral component was noted to be slightly loose rotationally, so I upsized to a size 3 broach which had excellent fit and fill.  After final irrigation, the final size 3B humeral stem was then impacted into position with excellent stability.  The final 48 x 20 head was also placed and impacted.  The shoulder was then reduced.  Again, good soft tissue tension and stability were confirmed.      The joint was extensively irrigated with pulse lavage.  Approximately half of 1 g vancomycin powder was sprinkled intra-articularly for perioperative infection prophylaxis.  Subscapularis was then repaired to the lesser tuberosity with the previously passed Nice loop sutures.  Lateral edge of the rotator interval was closed with #2 FiberWire.  The remainder of the vancomycin powder was then sprinkled into the  wound.  The deltopectoral interval was reapproximated with #2 FiberWire.  Skin was closed in layers with 2-0 Vicryl and 3-0 running subcuticular Monocryl.  A sterile, adhesive, waterproof bandage was applied.  The drapes were taken down and the arm was placed into a sling with abduction pillow.  Anesthesia was reversed and the patient was taken recovery in stable condition.  No complications were noted.  All sponge and needle counts were correct.    Postoperative plan:  Patient will be admitted to the hospital overnight for pain control, monitoring, and early participation with PT/OT.  Nonweightbearing on the involved upper extremity with sling immobilization at all times aside from hygiene and PT.  Multimodal DVT prophylaxis will consist of early mobilization, lower extremity weightbearing, and aspirin 81 mg p.o. twice daily x1 month.

## 2023-02-01 NOTE — ANESTHESIA POSTPROCEDURE EVALUATION
Patient: Donovan Butler    Procedure: Procedure(s):  RIGHT TOTAL SHOULDER ARTHROPLASTY       Anesthesia Type:  General    Note:  Disposition: Outpatient   Postop Pain Control: Uneventful            Sign Out: Well controlled pain   PONV: No   Neuro/Psych: Uneventful            Sign Out: Acceptable/Baseline neuro status   Airway/Respiratory: Uneventful            Sign Out: Acceptable/Baseline resp. status   CV/Hemodynamics: Uneventful            Sign Out: Acceptable CV status; No obvious hypovolemia; No obvious fluid overload   Other NRE: NONE   DID A NON-ROUTINE EVENT OCCUR? No    Event details/Postop Comments:  Pt denies any pain in shoulder, doing well.            Last vitals:  Vitals Value Taken Time   /67 02/01/23 1343   Temp 36.2  C (97.2  F) 02/01/23 1343   Pulse 73 02/01/23 1343   Resp 19 02/01/23 1343   SpO2 97 % 02/01/23 1343       Electronically Signed By: BINA ROMANO MD  February 1, 2023  2:16 PM

## 2023-02-01 NOTE — ANESTHESIA PROCEDURE NOTES
Brachial plexus Procedure Note    Pre-Procedure   Staff -        Anesthesiologist:  Emerald Allison MD       Performed By: anesthesiologist       Location: pre-op       Procedure Start/Stop Times: 2/1/2023 9:30 AM and 2/1/2023 9:35 AM       Pre-Anesthestic Checklist: patient identified, IV checked, site marked, risks and benefits discussed, informed consent, monitors and equipment checked, pre-op evaluation, at physician/surgeon's request and post-op pain management  Timeout:       Correct Patient: Yes        Correct Procedure: Yes        Correct Site: Yes        Correct Position: Yes        Correct Laterality: Yes        Site Marked: Yes  Procedure Documentation  Procedure: Brachial plexus       Patient Position: supine       Patient Prep/Sterile Barriers: sterile gloves, mask       Skin prep: Chloraprep (interscalene approach).       Needle Type: insulated       Needle Gauge: 20.        Needle Length (Inches): 4        Ultrasound guided       1. Ultrasound was used to identify targeted nerve, plexus, vascular marker, or fascial plane and place a needle adjacent to it in real-time.       2. Ultrasound was used to visualize the spread of anesthetic in close proximity to the above referenced structure.       3. A permanent image is entered into the patient's record.       4. The visualized anatomic structures appeared normal.       5. There were no apparent abnormal pathologic findings.    Assessment/Narrative         The placement was negative for: blood aspirated, painful injection and site bleeding       Paresthesias: No.       Bolus given via needle..        Secured via.        Insertion/Infusion Method: Single Shot       Complications: none       Injection made incrementally with aspirations every 5 mL.    Medication(s) Administered   Bupivacaine 0.5% PF (Infiltration) - Infiltration   12 mL - 2/1/2023 9:30:00 AM  Bupivacaine liposome (Exparel) 1.3% LA inj susp (Infiltration) - Infiltration   10 mL - 2/1/2023  "9:30:00 AM  Medication Administration Time: 2/1/2023 9:30 AM      FOR [facility] ([facility] ONLY:   Pain Team Contact information: please page the Pain Team Via [employer]. Search \"Pain\". During daytime hours, please page the attending first. At night please page the resident first.    "

## 2023-02-01 NOTE — ANESTHESIA CARE TRANSFER NOTE
Patient: Donovan Butler    Procedure: Procedure(s):  RIGHT TOTAL SHOULDER ARTHROPLASTY       Diagnosis: Osteoarthritis of glenohumeral joint [M19.019]  Diagnosis Additional Information: No value filed.    Anesthesia Type:   General     Note:    Oropharynx: oropharynx clear of all foreign objects  Level of Consciousness: drowsy  Oxygen Supplementation: face mask  Level of Supplemental Oxygen (L/min / FiO2): 8  Independent Airway: airway patency satisfactory and stable  Dentition: dentition unchanged  Vital Signs Stable: post-procedure vital signs reviewed and stable  Report to RN Given: handoff report given  Patient transferred to: PACU    Handoff Report: Identifed the Patient, Identified the Reponsible Provider, Reviewed the pertinent medical history, Discussed the surgical course, Reviewed Intra-OP anesthesia mangement and issues during anesthesia, Set expectations for post-procedure period and Allowed opportunity for questions and acknowledgement of understanding      Vitals:  Vitals Value Taken Time   /70 02/01/23 1307   Temp 36.7  C (98.1  F) 02/01/23 1306   Pulse 80 02/01/23 1308   Resp 17 02/01/23 1308   SpO2 100 % 02/01/23 1308   Vitals shown include unvalidated device data.    Electronically Signed By: EILEEN MOLINA CRNA  February 1, 2023  1:10 PM

## 2023-02-01 NOTE — ANESTHESIA PREPROCEDURE EVALUATION
Anesthesia Pre-Procedure Evaluation    Patient: Donovan Butler   MRN: [id number] : 1960        Procedure : Procedure(s):  RIGHT TOTAL SHOULDER ARTHROPLASTY          Past Medical History:   Diagnosis Date     COPD (chronic obstructive pulmonary disease) (H)      Depressive disorder       History reviewed. No pertinent surgical history.   No Known Allergies   Social History     Tobacco Use     Smoking status: Every Day     Packs/day: 0.50     Types: Cigarettes     Smokeless tobacco: Never   Substance Use Topics     Alcohol use: Not Currently     Comment: May 13,2022      Wt Readings from Last 1 Encounters:   23 76.5 kg (168 lb 11.2 oz)        Anesthesia Evaluation            ROS/MED HX  ENT/Pulmonary:     (+) tobacco use, Current use, COPD (denies, has been using inhaler and asymptomatic),     Neurologic:  - neg neurologic ROS     Cardiovascular:  - neg cardiovascular ROS     METS/Exercise Tolerance:     Hematologic:  - neg hematologic  ROS     Musculoskeletal:  - neg musculoskeletal ROS     GI/Hepatic:  - neg GI/hepatic ROS     Renal/Genitourinary:  - neg Renal ROS     Endo:  - neg endo ROS     Psychiatric/Substance Use:     (+) alcohol abuse (sober since may, 2022)     Infectious Disease:  - neg infectious disease ROS     Malignancy:  - neg malignancy ROS     Other:            Physical Exam    Airway  airway exam normal      Mallampati: I   TM distance: > 3 FB   Neck ROM: full     Respiratory Devices and Support         Dental       (+) Multiple visibly decayed, broken teeth    B=Bridge, C=Chipped, L=Loose, M=Missing    Cardiovascular   cardiovascular exam normal          Pulmonary   pulmonary exam normal                OUTSIDE LABS:  CBC: No results found for: WBC, HGB, HCT, PLT  BMP: No results found for: NA, POTASSIUM, CHLORIDE, CO2, BUN, CR, GLC  COAGS: No results found for: PTT, INR, FIBR  POC: No results found for: BGM, HCG, HCGS  HEPATIC: No results found for: ALBUMIN, PROTTOTAL, ALT, AST, GGT,  ALKPHOS, BILITOTAL, BILIDIRECT, ROCKY  OTHER: No results found for: PH, LACT, A1C, XIN, PHOS, MAG, LIPASE, AMYLASE, TSH, T4, T3, CRP, SED    Anesthesia Plan    ASA Status:  3      Anesthesia Type: General.     - Airway: ETT      Maintenance: Inhalation.        Consents    Anesthesia Plan(s) and associated risks, benefits, and realistic alternatives discussed. Questions answered and patient/representative(s) expressed understanding.     - Discussed: Risks, Benefits and Alternatives for BOTH SEDATION and the PROCEDURE were discussed     - Discussed with:  Patient         Postoperative Care       PONV prophylaxis: Ondansetron (or other 5HT-3), Dexamethasone or Solumedrol     Comments:                BINA ROMANO MD

## 2023-02-01 NOTE — ANESTHESIA PROCEDURE NOTES
Airway       Patient location during procedure: OR       Procedure Start/Stop Times: 2/1/2023 10:26 AM  Staff -        Anesthesiologist:  Emerald Allison MD       CRNA: Myrna Silva APRN CRNA       Performed By: CRNA  Consent for Airway        Urgency: elective  Indications and Patient Condition       Indications for airway management: dulce-procedural and airway protection       Induction type:intravenous       Mask difficulty assessment: 2 - vent by mask + OA or adjuvant +/- NMBA    Final Airway Details       Final airway type: endotracheal airway       Successful airway: ETT - single  Endotracheal Airway Details        ETT size (mm): 8.0       Cuffed: yes       Successful intubation technique: direct laryngoscopy       DL Blade Type: Ladd 2       Grade View of Cords: 1       Adjucts: stylet       Position: Left       Measured from: lips       Secured at (cm): 23       Bite block used: None    Post intubation assessment        Placement verified by: capnometry, equal breath sounds and chest rise        Number of attempts at approach: 1       Number of other approaches attempted: 0       Secured with: silk tape and commercial tube sandoval       Ease of procedure: easy       Dentition: Intact and Unchanged       Dental guard used and removed. Dental Guard Type: Proguard Red.    Medication(s) Administered   Medication Administration Time: 2/1/2023 10:26 AM

## 2023-02-01 NOTE — ANESTHESIA PROCEDURE NOTES
Cervical Plexus Procedure Note    Pre-Procedure   Staff -        Anesthesiologist:  Emerald Allison MD       Performed By: anesthesiologist       Location: pre-op       Procedure Start/Stop Times: 2/1/2023 9:30 AM and 2/1/2023 9:35 AM       Pre-Anesthestic Checklist: patient identified, IV checked, site marked, risks and benefits discussed, informed consent, monitors and equipment checked, pre-op evaluation, at physician/surgeon's request and post-op pain management  Timeout:       Correct Patient: Yes        Correct Procedure: Yes        Correct Site: Yes        Correct Position: Yes        Correct Laterality: Yes        Site Marked: Yes  Procedure Documentation  Procedure: Cervical Plexus       Diagnosis: SUPERFICIAL       Laterality: right       Patient Position: supine       Patient Prep/Sterile Barriers: sterile gloves, mask       Skin prep: Chloraprep       Needle Type: insulated       Needle Gauge: 20.        Needle Length (Inches): 4        Ultrasound guided       1. Ultrasound was used to identify targeted nerve, plexus, vascular marker, or fascial plane and place a needle adjacent to it in real-time.       2. Ultrasound was used to visualize the spread of anesthetic in close proximity to the above referenced structure.       3. A permanent image is entered into the patient's record.       4. The visualized anatomic structures appeared normal.       5. There were no apparent abnormal pathologic findings.    Assessment/Narrative         The placement was negative for: blood aspirated, painful injection and site bleeding       Paresthesias: No.       Bolus given via needle..        Secured via.        Insertion/Infusion Method: Single Shot       Complications: none       Injection made incrementally with aspirations every 5 mL.    Medication(s) Administered   Bupivacaine 0.5% PF (Infiltration) - Infiltration   3 mL - 2/1/2023 9:30:00 AM  Medication Administration Time: 2/1/2023 9:30 AM      FOR [facility]  "([facility]) ONLY:   Pain Team Contact information: please page the Pain Team Via [employer]. Search \"Pain\". During daytime hours, please page the attending first. At night please page the resident first.    "

## 2023-02-01 NOTE — PHARMACY-ADMISSION MEDICATION HISTORY
Pharmacy Note - Admission Medication History    Pertinent Provider Information:    ______________________________________________________________________    Prior To Admission (PTA) med list completed and updated in EMR.       PTA Med List   Medication Sig Last Dose     budesonide-formoterol (SYMBICORT) 160-4.5 MCG/ACT Inhaler Inhale 2 puffs into the lungs 2 times daily 1/31/2023 at has with     buPROPion (WELLBUTRIN XL) 300 MG 24 hr tablet Take 300 mg by mouth every morning 2/1/2023 at am     gabapentin (NEURONTIN) 100 MG capsule Take 200 mg by mouth 2 times daily 2/1/2023 at am     methocarbamol (ROBAXIN) 750 MG tablet Take 750 mg by mouth 2 times daily 1/31/2023     nicotine polacrilex (NICORETTE) 4 MG gum Place 4 mg inside cheek every hour as needed for smoking cessation Past Week       [facility](s[facility]    Method of interview communication: in-person    Patient was asked about OTC/herbal products specifically.  PTA med list reflects this.    Based on the pharmacist's assessment, the PTA med list information appears reliable    Medication Affordability:  Not including over the counter (OTC) medications, was there a time in the past 12 months when you did not take your medications as prescribed because of cost?: No    Allergies were reviewed, assessed, and updated with the patient.      Medications available for use during hospital stay: Symbicort.      Thank you for the opportunity to participate in the care of this patient.      Jacobo Colon RPH     2/1/2023     8:44 AM

## 2023-02-02 ENCOUNTER — APPOINTMENT (OUTPATIENT)
Dept: OCCUPATIONAL THERAPY | Facility: CLINIC | Age: 63
End: 2023-02-02
Attending: ORTHOPAEDIC SURGERY
Payer: COMMERCIAL

## 2023-02-02 VITALS
WEIGHT: 168.7 LBS | DIASTOLIC BLOOD PRESSURE: 74 MMHG | BODY MASS INDEX: 22.85 KG/M2 | HEART RATE: 56 BPM | OXYGEN SATURATION: 96 % | SYSTOLIC BLOOD PRESSURE: 111 MMHG | RESPIRATION RATE: 16 BRPM | TEMPERATURE: 97.7 F | HEIGHT: 72 IN

## 2023-02-02 LAB
GLUCOSE BLD-MCNC: 127 MG/DL (ref 70–125)
HGB BLD-MCNC: 13.2 G/DL (ref 13.3–17.7)

## 2023-02-02 PROCEDURE — 250N000011 HC RX IP 250 OP 636: Performed by: PHYSICIAN ASSISTANT

## 2023-02-02 PROCEDURE — 36415 COLL VENOUS BLD VENIPUNCTURE: CPT | Performed by: ORTHOPAEDIC SURGERY

## 2023-02-02 PROCEDURE — 250N000013 HC RX MED GY IP 250 OP 250 PS 637: Performed by: HOSPITALIST

## 2023-02-02 PROCEDURE — 99232 SBSQ HOSP IP/OBS MODERATE 35: CPT | Performed by: HOSPITALIST

## 2023-02-02 PROCEDURE — 85018 HEMOGLOBIN: CPT | Performed by: PHYSICIAN ASSISTANT

## 2023-02-02 PROCEDURE — 97535 SELF CARE MNGMENT TRAINING: CPT | Mod: GO

## 2023-02-02 PROCEDURE — 97165 OT EVAL LOW COMPLEX 30 MIN: CPT | Mod: GO

## 2023-02-02 PROCEDURE — 97110 THERAPEUTIC EXERCISES: CPT | Mod: GO

## 2023-02-02 PROCEDURE — 250N000013 HC RX MED GY IP 250 OP 250 PS 637: Performed by: PHYSICIAN ASSISTANT

## 2023-02-02 PROCEDURE — 82947 ASSAY GLUCOSE BLOOD QUANT: CPT | Performed by: ORTHOPAEDIC SURGERY

## 2023-02-02 RX ORDER — OXYCODONE HYDROCHLORIDE 5 MG/1
5 TABLET ORAL EVERY 4 HOURS PRN
Qty: 20 TABLET | Refills: 0 | Status: SHIPPED | OUTPATIENT
Start: 2023-02-02

## 2023-02-02 RX ORDER — AMOXICILLIN 250 MG
1 CAPSULE ORAL 2 TIMES DAILY
Qty: 30 TABLET | Refills: 0 | Status: SHIPPED | OUTPATIENT
Start: 2023-02-02

## 2023-02-02 RX ADMIN — BUPROPION HYDROCHLORIDE 300 MG: 300 TABLET, FILM COATED, EXTENDED RELEASE ORAL at 06:31

## 2023-02-02 RX ADMIN — CEFAZOLIN 1 G: 1 INJECTION, POWDER, FOR SOLUTION INTRAMUSCULAR; INTRAVENOUS at 02:15

## 2023-02-02 RX ADMIN — ACETAMINOPHEN 975 MG: 325 TABLET ORAL at 06:30

## 2023-02-02 RX ADMIN — METHOCARBAMOL 750 MG: 750 TABLET, FILM COATED ORAL at 08:17

## 2023-02-02 RX ADMIN — NICOTINE POLACRILEX 4 MG: 4 GUM, CHEWING ORAL at 08:23

## 2023-02-02 RX ADMIN — SENNOSIDES AND DOCUSATE SODIUM 1 TABLET: 50; 8.6 TABLET ORAL at 08:17

## 2023-02-02 RX ADMIN — BENZOCAINE AND MENTHOL 1 LOZENGE: 15; 3.6 LOZENGE ORAL at 03:06

## 2023-02-02 RX ADMIN — ASPIRIN 81 MG: 81 TABLET, COATED ORAL at 08:17

## 2023-02-02 RX ADMIN — GABAPENTIN 200 MG: 100 CAPSULE ORAL at 08:17

## 2023-02-02 RX ADMIN — POLYETHYLENE GLYCOL 3350 17 G: 17 POWDER, FOR SOLUTION ORAL at 08:17

## 2023-02-02 RX ADMIN — BENZOCAINE AND MENTHOL 1 LOZENGE: 15; 3.6 LOZENGE ORAL at 10:29

## 2023-02-02 ASSESSMENT — ACTIVITIES OF DAILY LIVING (ADL)
ADLS_ACUITY_SCORE: 21
ADLS_ACUITY_SCORE: 18
DEPENDENT_IADLS:: INDEPENDENT
ADLS_ACUITY_SCORE: 18
ADLS_ACUITY_SCORE: 18
ADLS_ACUITY_SCORE: 21
ADLS_ACUITY_SCORE: 18

## 2023-02-02 NOTE — PROGRESS NOTES
[facility]    Medicine Progress Note - Hospitalist Service    Date of Admission:  2/1/2023    Assessment & Plan   Donovan Butler is a 62 year old old male with a history of asthma, MDD, OA underwent right total shoulder surgery with general anesthesia. [facility] service was asked to evaluate patient for postoperative medical management as follows below. Please resume the home medications as reconciled and further noted below with ordered hold parameters.  Vital signs have been stable post-operatively including hemodynamically stable blood pressure and heart rate. Thank you for this consult; we will continue to follow this patient until discharge.    Asthma  -Order home inhalers and medications.   -Nebulizer and RT pulmonary hygiene as needed.     MDD/Anxiety  -Order home medications.     S/p Right Shoulder Surgery  Shoulder OA  Acute Post-Op Pain  -Agree with current pain control regimen; consider acute pain team consultation if pain becomes increasingly difficult to control or proves refractory.   -PT evaluation.   -OT evaluation.  -IS frequently, initially every hour while awake as tolerated. Directly encouraged and discussed.      Post-Op DVT Prophylaxis  -As per primary surgery team.      Procedure(s):  RIGHT TOTAL SHOULDER ARTHROPLASTY  Post-operative Day: Day of Surgery  Code status:Full Code     Type of Anesthesia   General    Estimated Blood Loss:  200 mL    Hospital Problem List   No problem-specific Assessment & Plan notes found for this encounter.    Principal Problem:    Status post total shoulder arthroplasty, right  Active Problems:    Mild intermittent asthma without complication    Recurrent major depressive disorder, in remission (H)      -Reviewed the patient's preoperative H and P and updated missing elements.  -Home medication reconciliation has been reviewed.  Medications have been ordered as noted from the home list and changes are documented above          Diet: Advance Diet  as Tolerated: Regular Diet Adult  Discharge Instruction - Regular Diet Adult    DVT Prophylaxis: Defer to primary service  Arce Catheter: Not present  Lines: None     Cardiac Monitoring: None  Code Status: Full Code      Clinically Significant Risk Factors Present on Admission                               Disposition Plan      Expected Discharge Date: 02/02/2023    Discharge Delays: *Early Discharge Anticipated  Destination: home with family            Deejay Owen MD  Hospitalist Service  [facility]  Securely message with [email or web address] (more info)  Text page via GitHub Paging/Directory   ______________________________________________________________________    Interval History   No acute events overnight.    No report of chest pain, shortness of breath, or other new complaints. Discussed plan of care with patient. Answered all questions to patient's verbalized understanding and satisfaction. Medication reconciliation completed for anticipated discharge and discussed with patient as well as counseling regarding lifestyle modifications and behaviors in setting of post-operative recovery in the coming weeks, outpatient rehabilitative follow-up plan pending final PT/OT recommendations, and follow-up in clinic with the primary care provider and with surgery as scheduled. All questions were answered to stated and verbalized satisfaction.       Physical Exam   Vital Signs: Temp: 97.7  F (36.5  C) Temp src: Oral BP: 111/74 Pulse: 56   Resp: 16 SpO2: 96 % O2 Device: None (Room air) Oxygen Delivery: 5 LPM  Weight: 168 lbs 11.2 oz    GENERAL:  Alert, appears comfortable, in no acute distress, appears stated age   HEAD:  Normocephalic, without obvious abnormality, atraumatic   EYES:  PERRL, conjunctiva/corneas clear, no scleral icterus, EOM's intact   NOSE: Nares normal, septum midline, mucosa normal, no drainage   THROAT: Lips, mucosa, and tongue normal; teeth and gums normal, mouth moist   NECK:  "Supple, symmetrical, trachea midline   BACK:   Symmetric, no curvature, ROM normal   LUNGS:   Clear to auscultation bilaterally, no rales, rhonchi, or wheezing, symmetric chest rise on inhalation, respirations unlabored   CHEST WALL:  No tenderness or deformity   HEART:  Regular rate and rhythm, S1 and S2 normal, no murmur, rub, or gallop    ABDOMEN:   Soft, non-tender, bowel sounds active all four quadrants, no masses, no organomegaly, no rebound or guarding   EXTREMITIES: Extremities normal, atraumatic, no cyanosis or edema    SKIN: Dry to touch, no exanthems in the visualized areas   NEURO: Alert, oriented x 4, moves all four extremities freely/spontaneously   PSYCH: Cooperative, behavior is appropriate          Medical Decision Making     42 MINUTES SPENT BY ME on the date of service doing chart review, history, exam, documentation & further activities per the note.      Data     I have personally reviewed the following data over the past 24 hrs:    N/A  \   13.2 (L)   / N/A     N/A N/A N/A /  127 (H)   N/A N/A N/A \       Imaging results reviewed over the past 24 hrs:   Recent Results (from the past 24 hour(s))   POC US Guidance Needle Placement    Narrative    Ultrasound was performed as guidance to an anesthesia procedure.  Click   \"PACS images\" hyperlink below to view any stored images.  For specific   procedure details, view procedure note authored by anesthesia.   XR Shoulder Right Port G/E 2 Views    Narrative    EXAM: XR SHOULDER RIGHT PORT G/E 2 VIEWS  LOCATION: [facility]  DATE/TIME: 2/1/2023 2:49 PM    INDICATION: Status post surgery  COMPARISON: 10/26/2022      Impression    IMPRESSION: Recent shoulder arthroplasty. Alignment is satisfactory. No acute fracture. Postoperative gas in the soft tissues.     "

## 2023-02-02 NOTE — CONSULTS
Care Management Initial Consult    General Information  Assessment completed with: VM-chart review,    Type of CM/SW Visit: Offer D/C Planning    Primary Care Provider verified and updated as needed: Yes   Readmission within the last 30 days:           Advance Care Planning:            Communication Assessment  Patient's communication style: spoken language (English or Bilingual)    Hearing Difficulty or Deaf: no (ringing in right ear)   Wear Glasses or Blind: no    Cognitive  Cognitive/Neuro/Behavioral: WDL                      Living Environment:   People in home: other (see comments)     Current living Arrangements: house      Able to return to prior arrangements: yes       Family/Social Support:  Care provided by: self  Provides care for: no one                Description of Support System:           Current Resources:   Patient receiving home care services: No     Community Resources: Drug Abuse  Equipment currently used at home: none  Supplies currently used at home: None    Employment/Financial:  Employment Status:          Financial Concerns:     Referral to Financial Worker: No       Lifestyle & Psychosocial Needs:  Social Determinants of Health     Tobacco Use: High Risk     Smoking Tobacco Use: Every Day     Smokeless Tobacco Use: Never     Passive Exposure: Not on file   Alcohol Use: Not on file   Financial Resource Strain: Not on file   Food Insecurity: Not on file   Transportation Needs: Not on file   Physical Activity: Not on file   Stress: Not on file   Social Connections: Not on file   Intimate Partner Violence: Not on file   Depression: Not on file   Housing Stability: Not on file       Functional Status:  Prior to admission patient needed assistance:   Dependent ADLs:: Independent  Dependent IADLs:: Independent       Mental Health Status:  Mental Health Status: No Current Concerns       Chemical Dependency Status:  Chemical Dependency Status: Past Concern  Chemical Dependency Management: Other  (see comment) ([facility])          Values/Beliefs:  Spiritual, Cultural Beliefs, Episcopal Practices, Values that affect care: no             Care Management Discharge Note    Discharge Date: 02/02/2023       Discharge Disposition: Home    Discharge Services:      Discharge DME: None    Discharge Transportation: family or friend will provide    Private pay costs discussed: Not applicable    PAS Confirmation Code:  (N/A)  Patient/family educated on Medicare website which has current facility and service quality ratings: no    Education Provided on the Discharge Plan:  Yes   Persons Notified of Discharge Plans: Pt   Patient/Family in Agreement with the Plan: yes    Handoff Referral Completed: No    Additional Information:  SWCM met and introduced self and CM services to Pt.  Pt lives in [facility] since Sept 2022.  Pt has friends and roommates that will assist.   will transport.  Pt has Out Pt PT schedule for Monday.  No Other CM needs identified.     WARNER London

## 2023-02-02 NOTE — PROGRESS NOTES
02/02/23 0741   Appointment Info   Signing Clinician's Name / Credentials (OT) Jennifer Franco OTR/L OTD   Living Environment   People in Home other (see comments)  (5 roommates)   Current Living Arrangements house  (sober living facility)   Home Accessibility stairs within home   Number of Stairs, Within Home, Primary greater than 10 stairs   Living Environment Comments bedroom and bathroom upstairs, tub/shower   Self-Care   Equipment Currently Used at Home none   Fall history within last six months yes   Number of times patient has fallen within last six months 2  (fallen on ice, cracked ribs)   Activity/Exercise/Self-Care Comment ind with all ADLs and IADLs   General Information   Onset of Illness/Injury or Date of Surgery 02/01/23   Referring Physician Peter Ferrell MD   Patient/Family Therapy Goal Statement (OT) To heal safely   Additional Occupational Profile Info/Pertinent History of Current Problem Donovan Butler is a 62 year old old male with a history of asthma, MDD, OA underwent right total shoulder surgery   Existing Precautions/Restrictions shoulder;weight bearing   Right Upper Extremity (Weight-bearing Status) non weight-bearing (NWB)   Cognitive Status Examination   Orientation Status orientation to person, place and time   Affect/Mental Status (Cognitive) WFL   Follows Commands WFL   Visual Perception   Visual Impairment/Limitations corrective lenses full-time   Sensory   Sensory Comments RUE continues to be numb d/t nerve block throughout session   Posture   Posture not impaired   Range of Motion Comprehensive   General Range of Motion no range of motion deficits identified   Comment, General Range of Motion RUE not tested d/t shoulder precautions   Strength Comprehensive (MMT)   General Manual Muscle Testing (MMT) Assessment no strength deficits identified   Comment, General Manual Muscle Testing (MMT) Assessment RUE not tested d/t shoulder precautions   Bed Mobility   Bed Mobility sit-supine    Sit-Supine [place] (Bed Mobility) supervision   Transfers   Transfers sit-stand transfer;toilet transfer   Sit-Stand Transfer   Sit-Stand [place] (Transfers) independent   Toilet Transfer   [place] Level (Toilet Transfer) supervision   Balance   Balance Comments Demos good balance with no AD   Activities of Daily Living   BADL Assessment/Intervention upper body dressing;lower body dressing   Upper Body Dressing Assessment/Training   [place] Level (Upper Body Dressing) moderate assist (50% patient effort)   Lower Body Dressing Assessment/Training   [place] Level (Lower Body Dressing) minimum assist (75% patient effort)   Clinical Impression   Criteria for Skilled Therapeutic Interventions Met (OT) Yes, treatment indicated   OT Diagnosis Decreased ind with ADLs and safety   Influenced by the following impairments S/p TSA   OT Problem List-Impairments impacting ADL pain;post-surgical precautions;range of motion (ROM)   Assessment of Occupational Performance 3-5 Performance Deficits   Identified Performance Deficits dressing, toileting, bed mobility, bathing   Planned Therapy Interventions (OT) ADL retraining;bed mobility training;home program guidelines   Clinical Decision Making Complexity (OT) low complexity   Risk & Benefits of therapy have been explained evaluation/treatment results reviewed;patient   OT Total Evaluation Time   OT Eval, Low Complexity Minutes (99169) 10   OT Goals   Therapy Frequency (OT) One time eval and treatment   OT Predicted Duration/Target Date for Goal Attainment 02/02/23   OT Goals Upper Body Dressing;Toilet Transfer/Toileting;Bed Mobility;OT Goal 1   OT: Upper Body Dressing Minimal assist;within precautions;Goal Met;Completed   OT: Bed Mobility Independent;supine to/from sitting;rolling;within precautions;Goal Met;Completed   OT: Toilet Transfer/Toileting Modified independent;toilet transfer;within precautions;Goal Met;Completed   OT: Goal 1 Pt will ina  understanding of RUE HEP with min cueing.   Interventions   Interventions Quick Adds Self-Care/Home Management;Therapeutic Procedures/Exercise   Self-Care/Home Management   Self-Care/Home Mgmt/ADL, Compensatory, Meal Prep Minutes (52688) 26   Symptoms Noted During/After Treatment (Meal Preparation/Planning Training) none   Treatment Detail/Skilled Intervention Educ pt on TSA precuations related to weight bearing, ROM, and ADL tasks, hand outs provided. Instructed on wear time for immobilizer and donning/doffing, pt completed min A following instruction. Educ on UE threading technique for UB dressing, completed with mod A d/t numbness of arm, demos ability to complete with SBA once sensation returns. LB dressing completed SBA, min cueing for technique. Instructed on bed mobility to L side d/t precautions, toilet transfer completed mod (I), cueing for L hand placement for stability. Fxl mobility in hallway ind, instructed on stairs with home railing set up, pt completed ind following instruction.   Therapeutic Procedures/Exercise   Therapeutic Procedure: strength, endurance, ROM, flexibillity minutes (48248) 13   Symptoms Noted During/After Treatment none   Treatment Detail/Skilled Intervention Instructed on RUE shoulder HEP - 10 reps each exercise, completed on LUE d/t numbness with RUE. Cueing for technique and positioning, hand out provided   OT Discharge Planning   OT Plan D/c OT   OT Discharge Recommendation (DC Rec) home with assist   OT Rationale for DC Rec Pt mobilizing well and completing ADLs with SBA-min A, has roommates to assist as needed.   OT Brief overview of current status Ind with fxl mobility, min A for UB dressing, SBA LB dressing   Total Session Time   Timed Code Treatment Minutes 39   Total Session Time (sum of timed and untimed services) 49

## 2023-02-02 NOTE — PROGRESS NOTES
Orthopedic Progress Note      Assessment: 1 Day Post-Op  S/P Procedure(s):  RIGHT TOTAL SHOULDER ARTHROPLASTY     Plan:   - Continue OT.  - Weightbearing status: NWB of the right Upper Extremity  - Continue sling & abduction pillow  - Anticoagulation: Aspirin 81 mg bid x30 days, in addition to SCDs, laura stockings and early ambulation.  - Discharge planning: Discharge to home.    Subjective:  Pain: Right shoulder pain well controlled on Tylenol & aspirin.  Nausea, Vomiting:  No  Chest pain: No  Lightheadedness, Dizziness:  No  Neuro: Block wearing off, no numbness or tingling in right fingers, moving fingers    Patient is doing well on POD #1. Tolerating oral intake, pain well controlled on oral medications, voiding & ambulating. Ready for discharge. Hgb 13.2.    Objective:  /74 (BP Location: Left arm)   Pulse 56   Temp 97.7  F (36.5  C) (Oral)   Resp 16   Ht 1.829 m (6')   Wt 76.5 kg (168 lb 11.2 oz)   SpO2 96%   BMI 22.88 kg/m    The patient is A&Ox3. Appears comfortable, sitting up at bedside & dressed. Wearing sling appropriately.  Sensation is intact to light touch in Right axillary, median, radial & ulnar nerve distributions.  Motor: Flexes elbow. Flexes & extends wrist. Full composite fist. Opposes thumb.  Palpable 2+ radial pulse. Hand warm with brisk cap refill in fingers.  Calves are soft and non-tender.   Mild edema of right shoulder. The incision is covered. Dressing C/D/I.     Pertinent Labs   Lab Results: personally reviewed.   No results found for: INR, PROTIME  Lab Results   Component Value Date    HGB 13.2 (L) 02/02/2023     No results found for: NA, CO2    Report completed by:  Clarissa Maya PA-C/Dr. Peter Ferrell  [facility]    Date: 2/2/2023  Time: 10:27 AM

## 2023-02-02 NOTE — CONSULTS
"ACUPUNCTURIST TREATMENT NOTE    Name: Donovan Butler  :  1960  MRN:  [id number]    Acupuncture Treatment  Patient Type: Orthopedic  Intervention Reason: Pain  Pain Location: (R) shoulder  Pre-session Pain Ratin  Post-session Pain Ratin  Patient complaint:: (R) shouler pain  Initial insertions: (R) St 8, Du 20, Ear: (shouler and shoulder joint); (R) LI 4, St 38  Number of needles inserted: 5  Number of needles removed: 5         \"Risks and benefits of acupuncture were discussed with patient. Consent for treatment was given. We thank you for the referral.\"     Le Sexton L.Ac.     Date:  2023  Time:  10:05 AM    "

## 2023-02-02 NOTE — PLAN OF CARE
Problem: Shoulder Arthroplasty (Total, Russell, Reverse)  Goal: Optimal Functional Ability  Outcome: Progressing  Intervention: Promote Optimal Functional Status    Goal: Acceptable Pain Control  Outcome: Progressing   Goal Outcome Evaluation:         A&Ox4. Denies pain. Reporting block still in place. Able to move hand. RUE remains in immobilizer. Home cryo cuff at bedside. Ambulating independently. Voiding without difficulty.

## 2023-02-02 NOTE — DISCHARGE SUMMARY
ORTHOPEDIC DISCHARGE SUMMARY       Donovan Butler,  1960, MRN [id number]    Admission Date: 2023      Admission Diagnoses: Osteoarthritis of glenohumeral joint [M19.019]  Status post total shoulder arthroplasty, right [Z96.611]     Discharge Date:  2023    Post-operative Day:  1 Day Post-Op    Reason for Admission: The patient was admitted for the following: Procedure(s):  RIGHT TOTAL SHOULDER ARTHROPLASTY    BRIEF HOSPITAL COURSE   Donovan Butler is a pleasant 62 year old male who underwent the aforementioned procedure with Dr. Ferrell on 23. There were no intraoperative complications and the patient was transferred to the recovery room and later the orthopedic unit in stable condition. Once the patient reached the orthopedic floor our orthopedic pain protocol was implemented along with the following:    Anticoagulation Medications: ASA 81 mg, take 1 tablet twice daily for 1 month  Therapy: OT  Activity: NWB at all times wearing sling  Bracing: Slingshot Brace    Consultations during Admission: Hospitalist service for medical management     COMPLICATIONS/SIGNIFICANT FINDINGS    none    DISCHARGE INFORMATION   Condition at discharge: Good  Discharge destination: Home  Patient was seen by myself on the date of discharge.    No use of operative arm. Sling full time, may remove for specified Lynn Exercises and Hygiene only.     FOLLOW UP CARE   Follow up with orthopedics in 2 weeks or sooner should the need arise. Ortho will continue to manage pain control, post op anticoagulation and incision care.     Follow up with your PCP for management of chronic medical problems and to evaluate for post op medical complications including constipation, nausea/vomiting, DVT/PE, anemia, changes in blood pressure, fevers/chills, urinary retention and atelectasis/pneumonia.     Subjective   Patient is doing well on POD #1. Pain is well controlled with oral medications. Ambulating. Tolerating oral intake.      Physical Exam   /74 (BP Location: Left arm)   Pulse 56   Temp 97.7  F (36.5  C) (Oral)   Resp 16   Ht 1.829 m (6')   Wt 76.5 kg (168 lb 11.2 oz)   SpO2 96%   BMI 22.88 kg/m    The patient is A&Ox3. Appears comfortable, sitting up at bedside & dressed. Wearing sling appropriately.  Sensation is intact to light touch in Right axillary, median, radial & ulnar nerve distributions.  Motor: Flexes elbow. Flexes & extends wrist. Full composite fist. Opposes thumb.  Palpable 2+ radial pulse. Hand warm with brisk cap refill in fingers.  Calves are soft and non-tender.   Mild edema of right shoulder. The incision is covered. Dressing C/D/I.    Pertinent Results at Discharge     Hemoglobin   Date/Time Value Ref Range Status   02/02/2023 06:58 AM 13.2 (L) 13.3 - 17.7 g/dL Final       Problem List   Principal Problem:    Status post total shoulder arthroplasty, right  Active Problems:    Mild intermittent asthma without complication    Recurrent major depressive disorder, in remission (H)      Clarissa Maya PA-C/Dr. Peter Ferrell  [facility]  [phone]  Date: 2/2/2023  Time: 10:23 AM

## 2023-02-02 NOTE — PLAN OF CARE
Problem: Shoulder Arthroplasty (Total, Russell, Reverse)  Goal: Optimal Functional Ability  Outcome: Progressing  Intervention: Promote Optimal Functional Status  Recent Flowsheet Documentation  Taken 2/1/2023 2030 by Lucie Kirkland RN  Activity Management:    activity adjusted per tolerance    ambulated in room     Problem: Shoulder Arthroplasty (Total, Russell, Reverse)  Goal: Optimal Coping  Outcome: Progressing   Goal Outcome Evaluation:       Patient vital signs are at baseline: Yes  Patient able to ambulate as they were prior to admission or with assist devices provided by therapies during their stay:  Yes  Patient MUST void prior to discharge:  Yes  Patient able to tolerate oral intake:  Yes  Pain has adequate pain control using Oral analgesics:  Yes  Does patient have an identified :  Yes  Has goal D/C date and time been discussed with patient:  Yes      Pt A/Ox4, able to make needs known. Denying pain in RUE, reports of numbness still present in RUE. Immobilizer in place. Reports of mild back pain managed with scheduled tylenol. Cryo cuff ice pack utilized. Dressing CDI. Voiding adequately. Independent in room. PRN cepacol lozenges given for c/o sore throat and cough. Saline locked. VSS, on room air. Plan to discharge home today.

## 2023-09-21 ENCOUNTER — LAB REQUISITION (OUTPATIENT)
Dept: LAB | Facility: CLINIC | Age: 63
End: 2023-09-21
Payer: COMMERCIAL

## 2023-09-21 DIAGNOSIS — Z13.220 ENCOUNTER FOR SCREENING FOR LIPOID DISORDERS: ICD-10-CM

## 2023-09-21 DIAGNOSIS — Z12.5 ENCOUNTER FOR SCREENING FOR MALIGNANT NEOPLASM OF PROSTATE: ICD-10-CM

## 2023-09-21 DIAGNOSIS — Z13.1 ENCOUNTER FOR SCREENING FOR DIABETES MELLITUS: ICD-10-CM

## 2023-09-21 LAB
ALBUMIN SERPL BCG-MCNC: 4.3 G/DL (ref 3.5–5.2)
ALP SERPL-CCNC: 59 U/L (ref 40–129)
ALT SERPL W P-5'-P-CCNC: 19 U/L (ref 0–70)
ANION GAP SERPL CALCULATED.3IONS-SCNC: 13 MMOL/L (ref 7–15)
AST SERPL W P-5'-P-CCNC: 17 U/L (ref 0–45)
BILIRUB SERPL-MCNC: 0.3 MG/DL
BUN SERPL-MCNC: 14.5 MG/DL (ref 8–23)
CALCIUM SERPL-MCNC: 8.6 MG/DL (ref 8.8–10.2)
CHLORIDE SERPL-SCNC: 102 MMOL/L (ref 98–107)
CHOLEST SERPL-MCNC: 257 MG/DL
CREAT SERPL-MCNC: 1.09 MG/DL (ref 0.67–1.17)
DEPRECATED HCO3 PLAS-SCNC: 26 MMOL/L (ref 22–29)
EGFRCR SERPLBLD CKD-EPI 2021: 77 ML/MIN/1.73M2
GLUCOSE SERPL-MCNC: 92 MG/DL (ref 70–99)
HDLC SERPL-MCNC: 42 MG/DL
LDLC SERPL CALC-MCNC: 163 MG/DL
NONHDLC SERPL-MCNC: 215 MG/DL
POTASSIUM SERPL-SCNC: 4.2 MMOL/L (ref 3.4–5.3)
PROT SERPL-MCNC: 6.8 G/DL (ref 6.4–8.3)
PSA SERPL DL<=0.01 NG/ML-MCNC: 0.92 NG/ML (ref 0–4.5)
SODIUM SERPL-SCNC: 141 MMOL/L (ref 136–145)
TRIGL SERPL-MCNC: 261 MG/DL

## 2023-09-21 PROCEDURE — G0103 PSA SCREENING: HCPCS | Mod: ORL | Performed by: FAMILY MEDICINE

## 2023-09-21 PROCEDURE — 80061 LIPID PANEL: CPT | Mod: ORL | Performed by: FAMILY MEDICINE

## 2023-09-21 PROCEDURE — 80053 COMPREHEN METABOLIC PANEL: CPT | Mod: ORL | Performed by: FAMILY MEDICINE

## (undated) DEVICE — A3 SUPPLIES- SEE NURSING INFO PAGE

## (undated) DEVICE — GLOVE UNDER INDICATOR PI SZ 6 LF 41660

## (undated) DEVICE — MAT FLOOR SURGICAL 40X38 0702140238

## (undated) DEVICE — SOL NACL 0.9% INJ 1000ML BAG 2B1324X

## (undated) DEVICE — GOWN IMPERVIOUS BREATHABLE SMART LG 89015

## (undated) DEVICE — GLOVE BIOGEL PI INDICATOR 8.0 LF 41680

## (undated) DEVICE — DRSG STERI STRIP 1/2X4" R1547

## (undated) DEVICE — PREP CHLORAPREP W/ORANGE TINT 10.5ML 930715

## (undated) DEVICE — BONE CEMENT MIXEVAC III HI VAC KIT  0206-015-000

## (undated) DEVICE — GLOVE BIOGEL PI SZ 6.0 40860

## (undated) DEVICE — DRAPE CONVERTORS U-DRAPE 60X72" 8476

## (undated) DEVICE — GLOVE BIOGEL PI ULTRATOUCH G SZ 7.5 42175

## (undated) DEVICE — SU MONOCRYL 3-0 PS-2 18" UND MCP497G

## (undated) DEVICE — BONE CLEANING TIP INTERPULSE  0210-010-000

## (undated) DEVICE — SUCTION MANIFOLD NEPTUNE 2 SYS 4 PORT 0702-020-000

## (undated) DEVICE — SOL WATER IRRIG 1000ML BOTTLE 2F7114

## (undated) DEVICE — SUTURE VICRYL+ 2-0 27IN CT-1 UND VCP259H

## (undated) DEVICE — SUTURE VICRYL+ 0 27IN CT-1 UND VCP260H

## (undated) DEVICE — GUIDEWIRE TORNIER AEQUALIS PERFORM +  2.5X220MM DWD017

## (undated) DEVICE — CUSTOM PACK OPEN SHOULDER SOP5BOSHEB

## (undated) DEVICE — SUCTION IRR SYSTEM W/O TIP INTERPULSE HANDPIECE 0210-100-000

## (undated) DEVICE — SU FIBERWIRE 2 38" T-8 NDL  AR-7206

## (undated) DEVICE — DRSG AQUACEL AG HYDROFIBER  3.5X10" 422605

## (undated) DEVICE — BLADE SAW SAGITTAL STRK WIDE 25.4X85X1.2MM 2108-151-000

## (undated) DEVICE — SOL NACL 0.9% IRRIG 1000ML BOTTLE 2F7124

## (undated) DEVICE — GUIDE PIN STERILE 3X75MM

## (undated) DEVICE — HOLDER LIMB VELCRO OR 0814-1533

## (undated) DEVICE — PLATE GROUNDING ADULT W/CORD 9165L

## (undated) RX ORDER — FENTANYL CITRATE-0.9 % NACL/PF 10 MCG/ML
PLASTIC BAG, INJECTION (ML) INTRAVENOUS
Status: DISPENSED
Start: 2023-02-01

## (undated) RX ORDER — PROPOFOL 10 MG/ML
INJECTION, EMULSION INTRAVENOUS
Status: DISPENSED
Start: 2023-02-01

## (undated) RX ORDER — ONDANSETRON 2 MG/ML
INJECTION INTRAMUSCULAR; INTRAVENOUS
Status: DISPENSED
Start: 2023-02-01

## (undated) RX ORDER — FENTANYL CITRATE 50 UG/ML
INJECTION, SOLUTION INTRAMUSCULAR; INTRAVENOUS
Status: DISPENSED
Start: 2023-02-01